# Patient Record
Sex: FEMALE | Race: WHITE | Employment: FULL TIME | URBAN - NONMETROPOLITAN AREA
[De-identification: names, ages, dates, MRNs, and addresses within clinical notes are randomized per-mention and may not be internally consistent; named-entity substitution may affect disease eponyms.]

---

## 2017-01-09 ENCOUNTER — OFFICE VISIT (OUTPATIENT)
Dept: OPHTHALMOLOGY | Age: 57
End: 2017-01-09

## 2017-01-09 DIAGNOSIS — H40.003 GLAUCOMA SUSPECT, BILATERAL: Primary | ICD-10-CM

## 2017-01-09 PROCEDURE — 99213 OFFICE O/P EST LOW 20 MIN: CPT | Performed by: OPHTHALMOLOGY

## 2017-01-09 RX ORDER — BENOXINATE HCL/FLUORESCEIN SOD 0.4%-0.25%
1 DROPS OPHTHALMIC (EYE) ONCE
Status: COMPLETED | OUTPATIENT
Start: 2017-01-09 | End: 2017-01-09

## 2017-01-09 RX ADMIN — Medication 1 DROP: at 16:47

## 2017-01-09 ASSESSMENT — TONOMETRY
OD_IOP_MMHG: 17
IOP_METHOD: NON-CONTACT AIR PUFF
OS_IOP_MMHG: 18

## 2017-01-09 ASSESSMENT — SLIT LAMP EXAM - LIDS
COMMENTS: NORMAL
COMMENTS: NORMAL

## 2017-01-09 ASSESSMENT — VISUAL ACUITY
OS_CC: 20/20
METHOD: SNELLEN - LINEAR

## 2017-01-09 ASSESSMENT — ENCOUNTER SYMPTOMS
RESPIRATORY NEGATIVE: 1
GASTROINTESTINAL NEGATIVE: 1

## 2017-07-13 ENCOUNTER — OFFICE VISIT (OUTPATIENT)
Dept: OPHTHALMOLOGY | Age: 57
End: 2017-07-13
Payer: COMMERCIAL

## 2017-07-13 DIAGNOSIS — H40.003 GLAUCOMA SUSPECT, BILATERAL: Primary | ICD-10-CM

## 2017-07-13 PROCEDURE — 99214 OFFICE O/P EST MOD 30 MIN: CPT | Performed by: OPHTHALMOLOGY

## 2017-07-13 RX ORDER — PHENYLEPHRINE HCL 2.5 %
1 DROPS OPHTHALMIC (EYE) ONCE
Status: COMPLETED | OUTPATIENT
Start: 2017-07-13 | End: 2017-07-13

## 2017-07-13 RX ORDER — KETOCONAZOLE 20 MG/G
CREAM TOPICAL DAILY
COMMUNITY

## 2017-07-13 RX ORDER — TROPICAMIDE 10 MG/ML
1 SOLUTION/ DROPS OPHTHALMIC ONCE
Status: COMPLETED | OUTPATIENT
Start: 2017-07-13 | End: 2017-07-13

## 2017-07-13 RX ORDER — BENOXINATE HCL/FLUORESCEIN SOD 0.4%-0.25%
1 DROPS OPHTHALMIC (EYE) ONCE
Status: COMPLETED | OUTPATIENT
Start: 2017-07-13 | End: 2017-07-13

## 2017-07-13 RX ADMIN — Medication 1 DROP: at 16:20

## 2017-07-13 RX ADMIN — TROPICAMIDE 1 DROP: 10 SOLUTION/ DROPS OPHTHALMIC at 16:20

## 2017-07-13 ASSESSMENT — CONF VISUAL FIELD
OD_NORMAL: 1
OS_NORMAL: 1

## 2017-07-13 ASSESSMENT — TONOMETRY
OS_IOP_MMHG: 16
OD_IOP_MMHG: 18

## 2017-07-13 ASSESSMENT — VISUAL ACUITY
OS_CC: 20/20
CORRECTION_TYPE: GLASSES
METHOD: SNELLEN - LINEAR

## 2017-07-13 ASSESSMENT — SLIT LAMP EXAM - LIDS: COMMENTS: NORMAL

## 2017-07-13 ASSESSMENT — ENCOUNTER SYMPTOMS
RESPIRATORY NEGATIVE: 1
GASTROINTESTINAL NEGATIVE: 1

## 2017-10-02 ENCOUNTER — OFFICE VISIT (OUTPATIENT)
Dept: OPTOMETRY | Age: 57
End: 2017-10-02
Payer: COMMERCIAL

## 2017-10-02 DIAGNOSIS — B02.9 HERPES ZOSTER WITHOUT COMPLICATION: Primary | ICD-10-CM

## 2017-10-02 PROCEDURE — 99202 OFFICE O/P NEW SF 15 MIN: CPT | Performed by: OPTOMETRIST

## 2017-10-02 RX ORDER — ACYCLOVIR 400 MG/1
400 TABLET ORAL
COMMUNITY
End: 2017-11-10 | Stop reason: CLARIF

## 2017-10-02 ASSESSMENT — VISUAL ACUITY
METHOD: SNELLEN - LINEAR
OS_CC: 20/20

## 2017-10-04 NOTE — PATIENT INSTRUCTIONS
Continue with the orals meds as prescribed; No drop was added because the eye itself is unaffected ;   If noticed any changes call immediately

## 2017-11-09 RX ORDER — SUCRALFATE 1 G/1
1 TABLET ORAL 4 TIMES DAILY
COMMUNITY

## 2017-11-09 RX ORDER — TACROLIMUS 1 MG/G
1 OINTMENT TOPICAL DAILY
COMMUNITY

## 2017-11-09 RX ORDER — ALENDRONATE SODIUM 70 MG/1
70 TABLET ORAL
COMMUNITY
Start: 2017-01-05 | End: 2020-01-13 | Stop reason: ALTCHOICE

## 2017-11-09 RX ORDER — DESLORATADINE 5 MG/1
5 TABLET ORAL
COMMUNITY
Start: 2016-03-21 | End: 2017-11-10 | Stop reason: CLARIF

## 2017-11-10 ENCOUNTER — INITIAL CONSULT (OUTPATIENT)
Dept: SURGERY | Age: 57
End: 2017-11-10
Payer: COMMERCIAL

## 2017-11-10 VITALS
HEART RATE: 84 BPM | BODY MASS INDEX: 30.56 KG/M2 | WEIGHT: 179 LBS | SYSTOLIC BLOOD PRESSURE: 110 MMHG | HEIGHT: 64 IN | DIASTOLIC BLOOD PRESSURE: 76 MMHG

## 2017-11-10 DIAGNOSIS — R10.84 GENERALIZED ABDOMINAL PAIN: ICD-10-CM

## 2017-11-10 DIAGNOSIS — R11.2 NAUSEA AND VOMITING, INTRACTABILITY OF VOMITING NOT SPECIFIED, UNSPECIFIED VOMITING TYPE: ICD-10-CM

## 2017-11-10 DIAGNOSIS — R11.0 NAUSEA: Primary | ICD-10-CM

## 2017-11-10 DIAGNOSIS — K62.5 RECTAL BLEEDING: ICD-10-CM

## 2017-11-10 DIAGNOSIS — K62.89 RECTAL PAIN: ICD-10-CM

## 2017-11-10 PROCEDURE — 99214 OFFICE O/P EST MOD 30 MIN: CPT | Performed by: SURGERY

## 2017-11-10 NOTE — PROGRESS NOTES
Maicol Bailey is a 59 y.o. female          CC:    Chief Complaint   Patient presents with    Heartburn    Nausea & Vomiting    Abdominal Pain         HISTORY OF PRESENT ILLNESS:    EGD  Nausea: yes  Vomiting: yes  Heartburn:yes  Dysphagia:no  Hematemesis:no  Epigastric pain:yes, tender to touch  Anemia: unknown  Previous work up date:2009-EGD=mild to moderate inflammation  Current Treatment:Carafate 2 BID, Prevacid BID          Colonoscopy  Abd pain: yes  Anemia: unknown  Bloating:yes  Diarrhea: yes  Constipation: yes  Melena: no  Hematochezia:no  Rectal Bleeding:no  Rectal/Anal Pain:yes, pressure  Pruritus: no  Family history colon Cancer: no  Previous colon cancer: no  Previous Colon Polyp: no  Change in bowels: yes  Decrease caliber of stool: no  Change in color of stool: no    Previous work up date: 2009      Patient is a 61-year-old female who is had a long history of GERD with reflux nausea and now has had increased vomiting and acid taste in the back of her throat. She has been on Prevacid twice a day and Carafate twice a day. Her last scope was in 2009 and she had one in 2003 with an EGD. These were both showed signs of gastritis and esophagitis with a positive H. pylori in 2003. She also complains of rectal bleeding and rectal pain and pressure in the rectal area as well as lower abdominal pain and irritability. She also has loose stools. And urgency.           Review of Systems:    General:  Fever: Negative  Weight Change:positive for 30lb wt loss the past 6 months  Night Sweats: Positive    Eye:  Blurry Vision:Negative  Double Vision: Negative    Ent:  Headaches: Negative  Sore throat: Negative    Allergy/Immunology:  Hives: Negative  Latex allergy: Negative    Hematology/Lymphatic:  Bleeding Problems: Negative  Blood Clots: Negative  Swollen Lymph Nodes: Negative    Lungs:  Cough: Negative  SOB: Negative  Wheezing:Negative    Cardiovascular:  Chest Pain: Positive  Palpitations:Negative    GI:   Decreased Appetite: Positive  Heartburn: Positive  Dysphagia: Negative  Nausea/Vomiting: Positive  Abdominal Pain: Positive  Change in Bowels:Positive  Constipation: Positive  Diarrhea: Positive  Rectal Bleeding: Negative    :   Dysuria: Negative  Increase Urinary Frequency/Urgency: Negative    Neuro:  Seizures: Negative  Confusion: Negative          PAST MEDICAL HISTORY:    Family History   Problem Relation Age of Onset    Glaucoma Mother     Coronary Art Dis Mother     Cataracts Neg Hx     Diabetes Neg Hx      Social History     Social History    Marital status:      Spouse name: N/A    Number of children: N/A    Years of education: N/A     Occupational History    Not on file.      Social History Main Topics    Smoking status: Never Smoker    Smokeless tobacco: Never Used    Alcohol use Not on file    Drug use: Unknown    Sexual activity: Not on file     Other Topics Concern    Not on file     Social History Narrative    No narrative on file     Past Surgical History:   Procedure Laterality Date    ABLATION OF DYSRHYTHMIC FOCUS  11/2045    APPENDECTOMY      CARDIAC CATHETERIZATION      CHOLECYSTECTOMY      COLONOSCOPY  2003    COLONOSCOPY  2009    HYSTERECTOMY      HYSTERECTOMY, TOTAL ABDOMINAL      SALIVARY GLAND SURGERY      UPPER GASTROINTESTINAL ENDOSCOPY  2003    gastric polyp, positive H-pylori, severe gastritis, [address]    UPPER GASTROINTESTINAL ENDOSCOPY  2009    mild to moderate chronic inflammation, [address]     Past Medical History:   Diagnosis Date    GERD (gastroesophageal reflux disease)     History of hypercholesterolemia     Tachycardia, paroxysmal ([facility] [address]     Thyroid disease      Current Outpatient Prescriptions on File Prior to Visit   Medication Sig Dispense Refill    Cholecalciferol (VITAMIN D3) 1000 units CAPS Take 1,000 Units by mouth      tacrolimus (PROTOPIC) 0.1 % ointment Apply 1 each topically daily Apply topically 2 times daily.  sucralfate (CARAFATE) 1 GM tablet Take 1 g by mouth 4 times daily      Loratadine (CLARITIN PO) Take 1 tablet by mouth daily      ketoconazole (NIZORAL) 2 % cream Apply topically daily Apply topically daily.  ALPRAZolam (XANAX) 0.25 MG tablet Take 0.25 mg by mouth nightly as needed for Sleep      lactobacillus acidophilus (FLORANEX) Take 1 tablet by mouth 2 times daily      thyroid (ARMOUR THYROID) 60 MG tablet Take 60 mg by mouth daily.  Lansoprazole (PREVACID PO) Take 30 mg by mouth 2 times daily.  alendronate (FOSAMAX) 70 MG tablet Take 70 mg by mouth      pravastatin (PRAVACHOL) 20 MG tablet Take 20 mg by mouth daily      ergocalciferol (ERGOCALCIFEROL) 46053 UNITS capsule Take 50,000 Units by mouth 2 times daily      Multiple Vitamins-Minerals (THERAPEUTIC MULTIVITAMIN-MINERALS) tablet Take 1 tablet by mouth daily       No current facility-administered medications on file prior to visit. Allergies as of 11/10/2017 - Review Complete 11/10/2017   Allergen Reaction Noted    Sulfa antibiotics Anaphylaxis 10/15/2014    Wine [alcohol] Swelling 11/10/2017    Compazine [prochlorperazine maleate] Other (See Comments) 10/15/2014    Iv contrast [iodides] Hives 10/15/2014    Macrodantin [nitrofurantoin macrocrystal]  12/09/2015    Prilosec [omeprazole]  12/09/2015    Zantac [ranitidine hcl]  12/09/2015         PHYSICAL EXAM:    Blood pressure 110/76, pulse 84, height 5' 3.5\" (1.613 m), weight 179 lb (81.2 kg), last menstrual period 07/10/1989.     Gen:  A and O x 3, NAD, well nourished  Eyes:  Sclera non icterus, PERRL  Head:  Normocephalic, non-tender  Neck:  Supple, no adenopathy, thyroid non tender and no masses,no carotid bruits  Lungs:  CTA, symmetrical  Chest:  RRR, no murmurs  Abd:  Soft, NT, ND, no HSM, no hernias, no bruits  Ext:  No edema, no cyanosis  Psych: reveals appropriate mood, memory and judgment,  Neuro:  Reveals no gross motor or sensory deficits,

## 2017-12-11 DIAGNOSIS — R63.4 WEIGHT LOSS: Primary | ICD-10-CM

## 2017-12-11 DIAGNOSIS — R10.84 GENERALIZED ABDOMINAL PAIN: ICD-10-CM

## 2017-12-11 DIAGNOSIS — R19.7 DIARRHEA, UNSPECIFIED TYPE: ICD-10-CM

## 2020-01-13 ENCOUNTER — OFFICE VISIT (OUTPATIENT)
Dept: PODIATRY | Age: 60
End: 2020-01-13
Payer: COMMERCIAL

## 2020-01-13 VITALS
SYSTOLIC BLOOD PRESSURE: 118 MMHG | HEART RATE: 68 BPM | WEIGHT: 140 LBS | HEIGHT: 64 IN | DIASTOLIC BLOOD PRESSURE: 70 MMHG | BODY MASS INDEX: 23.9 KG/M2

## 2020-01-13 PROCEDURE — 99202 OFFICE O/P NEW SF 15 MIN: CPT | Performed by: PODIATRIST

## 2020-01-13 RX ORDER — PIMECROLIMUS 10 MG/G
CREAM TOPICAL
COMMUNITY
Start: 2019-11-26

## 2020-01-13 NOTE — PROGRESS NOTES
Temperature within normal limits bilateral. Hyperpigmentation absent bilateral. No atrophic skin. Neurological: Sensation intact to light touch to level of digits, bilateral.  Protective sensation intact 10/10 sites via 5.07/10g [place] Monofilament, bilateral.  negative Tinel's, bilateral.  negative Valleix sign, bilateral.  Vibratory intact bilateral.  Reflexes Decreased bilateral.  Paresthesias negative. Dysthesias negative. Sharp/dull intact bilateral.    Musculoskeletal: Muscle strength 5/5, Bilateral.  Pain absent upon palpation of medial calcaneal tubercle, Bilateral. decreased medial longitudinal arch, Bilateral.  Ankle ROM within normal limits,Bilateral.  1st MPJ ROM within normal limits, Bilateral.  Dorsally contracted digits absent digits , Bilateral. Other foot deformities pronation with WB. Integument: Warm, dry, supple, bilateral.  Open lesion absent, bilateral.  Interdigital maceration absent to web spaces bilateral.  Nails within normal limits. Fissures absent, bilateral. Hyperkeratotic tissue is absent. Gait analysis:   RCSP left is 1 degrees. Right is 1 degrees. NCSP left is 0 degrees. Right is 0 degrees. Medial talonavicular bulge is present Bilateral.  Pes abductus is absent Bilateral.  Early toe off absent Bilateral.  Limb length discrepancy absent. Asessment: Patient is a 62 y.o. female with:    Diagnosis Orders   1. Plantar fasciitis, bilateral     2. Pain of right lower extremity     3. Pronation of both feet         Plan: Patient examined and evaluated. Current condition and treatment options discussed in detail. Patient was given plantar fasciitis instruction sheet. Patient will start stretching, icing, anti-inflammatory, and arch supports in shoe gear 100% of the time WB. Patient will not go barefoot. Codes given for custom orthotics. Pt will contact their insurance company and consider this option to treat the deformity/pain.   Pt was educated on how

## 2020-01-13 NOTE — PATIENT INSTRUCTIONS
: Custom fit Orthotics  $490  [id number]: Mold for Custom fit orthotics $76      Diagnosis Codes:    Diagnosis Orders   1. Plantar fasciitis, bilateral     2. Pain of right lower extremity         Plantar Fasciitis Treatment    1. Stretching - 3 times per day minimum for 5-10 minutes. Include stretching against a wall or counter with one foot back and heel flat,    Leaning into a step, or using a towel or belt. Be active and aggressive with your stretching for maximum benefit. 2.  Ice - 3 times per day. Use a frozen water bottle and roll your foot over it. Helps to  stretch and massage the area at the same time. 3.  Anti-inflammatory - take OTC or Rx as ordered. 4.  Arch support and supportive shoe gear. Wear orthotics or prefabricated arch supports at ALL times in supportive  shoegear. Do NOT go barefoot.

## 2020-02-14 ENCOUNTER — OFFICE VISIT (OUTPATIENT)
Dept: PODIATRY | Age: 60
End: 2020-02-14
Payer: COMMERCIAL

## 2020-02-14 VITALS
SYSTOLIC BLOOD PRESSURE: 118 MMHG | HEIGHT: 64 IN | DIASTOLIC BLOOD PRESSURE: 76 MMHG | WEIGHT: 140 LBS | BODY MASS INDEX: 23.9 KG/M2

## 2020-02-14 PROCEDURE — 99999 PR OFFICE/OUTPT VISIT,PROCEDURE ONLY: CPT | Performed by: PODIATRIST

## 2020-02-14 NOTE — LETTER
[address] Podiatry A department of [facility]  Phone: [phone]  Fax: [phone]    TIANA [facility]        February 14, 2020     Patient: Ian Steel   YOB: 1960   Date of Visit: 2/14/2020       To Whom It May Concern: It is my medical opinion that Ian Steel had an appointment with Dr. Julia Jiang and may return to work on 2/14/2020    If you have any questions or concerns, please don't hesitate to call.     Sincerely,        Wanda Nation DPM

## 2020-02-14 NOTE — PROGRESS NOTES
Patient presents to the clinic today for  of custom molded orthotic devices. These were fit to the patients foot and shoe gear and fit well. Patient was advised there may be a break in period for these. They should gradually increase the amount of time they wear these until it is 100% comfortable. Any troubles, pain, or signs of irritation and the patient should present back to the clinic immediately for modification. Otherwise the patient should be seen back on a PRN basis pending effectiveness of the orthotic therapy. Information given on [address] [place] [address] in [place], [place]. They were advised importance of appropriate fitting and supportive tennis shoes and how [employer] can help.

## 2023-01-10 DIAGNOSIS — U07.1 PNEUMONIA DUE TO COVID-19 VIRUS: ICD-10-CM

## 2023-01-10 DIAGNOSIS — E78.5 DYSLIPIDEMIA: ICD-10-CM

## 2023-01-10 DIAGNOSIS — R94.30 CARDIAC LEFT VENTRICULAR EJECTION FRACTION GREATER THAN 40 PERCENT: ICD-10-CM

## 2023-01-10 DIAGNOSIS — M25.50 ARTHRALGIA, UNSPECIFIED JOINT: ICD-10-CM

## 2023-01-10 DIAGNOSIS — I47.9 TACHYCARDIA, PAROXYSMAL (HCC): ICD-10-CM

## 2023-01-10 DIAGNOSIS — K58.0 IRRITABLE BOWEL SYNDROME WITH DIARRHEA: ICD-10-CM

## 2023-01-10 DIAGNOSIS — M79.10 MYALGIA: ICD-10-CM

## 2023-01-10 DIAGNOSIS — R00.2 PALPITATIONS: ICD-10-CM

## 2023-01-10 DIAGNOSIS — K52.9 GASTROENTERITIS, ACUTE: ICD-10-CM

## 2023-01-10 DIAGNOSIS — J12.82 PNEUMONIA DUE TO COVID-19 VIRUS: ICD-10-CM

## 2023-01-10 DIAGNOSIS — G89.29 CHRONIC RIGHT-SIDED LOW BACK PAIN WITH RIGHT-SIDED SCIATICA: ICD-10-CM

## 2023-01-10 DIAGNOSIS — Z86.79 S/P ABLATION OF ATRIAL FLUTTER: ICD-10-CM

## 2023-01-10 DIAGNOSIS — E07.9 THYROID DISEASE: ICD-10-CM

## 2023-01-10 DIAGNOSIS — I47.1 SVT (SUPRAVENTRICULAR TACHYCARDIA) (HCC): ICD-10-CM

## 2023-01-10 DIAGNOSIS — E55.9 VITAMIN D DEFICIENCY: ICD-10-CM

## 2023-01-10 DIAGNOSIS — I49.1 PREMATURE ATRIAL BEATS: ICD-10-CM

## 2023-01-10 DIAGNOSIS — Z86.39 HISTORY OF HYPERCHOLESTEROLEMIA: Primary | ICD-10-CM

## 2023-01-10 DIAGNOSIS — I48.3 TYPICAL ATRIAL FLUTTER (HCC): ICD-10-CM

## 2023-01-10 DIAGNOSIS — M54.41 CHRONIC RIGHT-SIDED LOW BACK PAIN WITH RIGHT-SIDED SCIATICA: ICD-10-CM

## 2023-01-10 DIAGNOSIS — Z98.890 S/P ABLATION OF ATRIAL FLUTTER: ICD-10-CM

## 2023-01-10 DIAGNOSIS — F41.1 GENERALIZED ANXIETY DISORDER: ICD-10-CM

## 2023-01-10 PROBLEM — M81.0 OSTEOPOROSIS: Status: ACTIVE | Noted: 2023-01-10

## 2023-01-10 PROBLEM — I47.10 SVT (SUPRAVENTRICULAR TACHYCARDIA): Status: ACTIVE | Noted: 2023-01-10

## 2023-01-10 PROBLEM — K21.9 GERD (GASTROESOPHAGEAL REFLUX DISEASE): Status: ACTIVE | Noted: 2023-01-10

## 2023-01-10 RX ORDER — AZELASTINE 1 MG/ML
1 SPRAY, METERED NASAL 2 TIMES DAILY
COMMUNITY

## 2023-01-10 RX ORDER — DENOSUMAB 60 MG/ML
60 INJECTION SUBCUTANEOUS
COMMUNITY

## 2023-01-10 RX ORDER — PROPAFENONE HYDROCHLORIDE 150 MG/1
300 TABLET, FILM COATED ORAL 2 TIMES DAILY PRN
COMMUNITY

## 2023-01-10 RX ORDER — IBUPROFEN 200 MG
2 CAPSULE ORAL DAILY
COMMUNITY

## 2023-01-10 RX ORDER — DESONIDE 0.5 MG/G
CREAM TOPICAL 2 TIMES DAILY
COMMUNITY

## 2023-01-10 RX ORDER — ESTRADIOL 0.1 MG/G
0.5 CREAM VAGINAL
COMMUNITY
Start: 2020-02-10

## 2023-01-10 RX ORDER — ALBUTEROL SULFATE 90 UG/1
2 AEROSOL, METERED RESPIRATORY (INHALATION) EVERY 6 HOURS PRN
COMMUNITY
Start: 2022-12-08

## 2023-01-10 RX ORDER — TAMSULOSIN HYDROCHLORIDE 0.4 MG/1
0.4 CAPSULE ORAL DAILY
COMMUNITY

## 2023-01-10 RX ORDER — ONDANSETRON 4 MG/1
4 TABLET, ORALLY DISINTEGRATING ORAL EVERY 8 HOURS PRN
COMMUNITY
Start: 2022-12-08

## 2023-01-10 RX ORDER — ACETAMINOPHEN 500 MG
500 TABLET ORAL EVERY 6 HOURS PRN
COMMUNITY

## 2023-01-10 RX ORDER — FAMOTIDINE 20 MG/1
20 TABLET, FILM COATED ORAL 2 TIMES DAILY
COMMUNITY
Start: 2023-01-09

## 2023-01-10 RX ORDER — DICYCLOMINE HYDROCHLORIDE 10 MG/1
10 CAPSULE ORAL 4 TIMES DAILY
COMMUNITY
Start: 2022-10-13

## 2023-01-10 RX ORDER — HYDROXYZINE PAMOATE 25 MG/1
25 CAPSULE ORAL NIGHTLY
COMMUNITY

## 2023-01-10 RX ORDER — FLUTICASONE PROPIONATE 50 MCG
1 SPRAY, SUSPENSION (ML) NASAL DAILY
COMMUNITY

## 2023-01-20 ENCOUNTER — INITIAL CONSULT (OUTPATIENT)
Dept: SURGERY | Age: 63
End: 2023-01-20
Payer: COMMERCIAL

## 2023-01-20 VITALS
HEIGHT: 63 IN | TEMPERATURE: 97 F | DIASTOLIC BLOOD PRESSURE: 68 MMHG | HEART RATE: 93 BPM | OXYGEN SATURATION: 99 % | SYSTOLIC BLOOD PRESSURE: 110 MMHG | WEIGHT: 163.8 LBS | BODY MASS INDEX: 29.02 KG/M2

## 2023-01-20 DIAGNOSIS — K21.9 GASTROESOPHAGEAL REFLUX DISEASE WITHOUT ESOPHAGITIS: Primary | ICD-10-CM

## 2023-01-20 PROCEDURE — 99214 OFFICE O/P EST MOD 30 MIN: CPT | Performed by: SURGERY

## 2023-01-20 NOTE — PROGRESS NOTES
History of Present Illness:  EGD         Nausea: yes, 2 days a few weeks ago  Vomiting: yes, 2 days a few weeks ago  Heartburn:yes, food related   Dysphagia:no  Hematemesis:no  Epigastric pain:yes, worse in the last 2 weeks  Anemia: no  Previous work up date:12-1-2017 by Dr. Liudmila Tran with findings of esophagitis and gastritis  Current Treatment:Pepcid 20 mg twice daily, Prevacid 30 mg twice daily  for very long timg and Carafate as needed

## 2023-01-20 NOTE — PROGRESS NOTES
Lavon Marmolejo is a 57 y.o. female      CC:    Nausea/vomiting  Upper abdominal pain    HISTORY OF PRESENT ILLNESS:    Pt is 58 yo F who states that 2 weeks ago she developed severe vomiting for 2 days pain that developed on the right and the left upper abdomen and went to the epigastric area. She had dry heaves. She started taking Zofran which helped. The nausea and vomiting lasted about 2 to 3 days. And then she could not eat for about a week. Now she is back to normal she can eat but she still has pain on and off. Food search the pain she is not sure of any alleviating factors. The pain last about 5 minutes. Happens most of the time that she eats pain is as bad as it was 2 weeks ago but she has no vomiting. She does have heartburn but is not much she has no dysphagia. She has a history of a gastric ulcer she had a scope in 2017 with with it was fairly normal she did have a previous history of a gastric ulcer. She denies any significant lower GI symptoms.   She is up-to-date on her colonoscopy       Nausea: yes, 2 days a few weeks ago  Vomiting: yes, 2 days a few weeks ago  Heartburn:yes, food related   Dysphagia:no  Hematemesis:no  Epigastric pain:yes, worse in the last 2 weeks  Anemia: no  Previous work up date:12-1-2017 by Dr. Lupe Aponte with findings of esophagitis and gastritis  Current Treatment:Pepcid 20 mg twice daily, Prevacid 30 mg twice daily  for very long timg and Carafate as needed             Review of Systems:    General:  Fever: Negative  Weight Change:Negative  Night Sweats: Negative    Eye:  Blurry Vision:Negative  Double Vision: Negative    Ent:  Headaches: Negative  Sore throat: Negative    Allergy/Immunology:  Hives: Negative  Latex allergy: Negative    Hematology/Lymphatic:  Bleeding Problems: Negative  Blood Clots: Negative  Swollen Lymph Nodes: Negative    Lungs:  Cough: Negative  SOB: Negative  Wheezing:Negative    Cardiovascular:  Chest Pain: Negative  Palpitations:Negative    GI:   Decreased Appetite: Negative  Heartburn: Negative  Dysphagia: Negative  Nausea/Vomiting: Negative  Abdominal Pain: Negative  Change in Bowels:Negative  Constipation: Negative  Diarrhea: Negative  Rectal Bleeding: Negative    :   Dysuria: Negative  Increase Urinary Frequency/Urgency: Negative    Neuro:  Seizures: Negative  Confusion: Negative        PAST MEDICAL HISTORY:      Family History   Problem Relation Age of Onset    Hypertension Mother     Heart Attack Mother     Heart Failure Mother     Glaucoma Mother     Coronary Art Dis Mother     Heart Disease Mother     Heart Failure Father     Hypertension Father     Coronary Art Dis Father     COPD Father     Heart Attack Father     Hypertension Brother     Heart Disease Brother     Cataracts Neg Hx     Diabetes Neg Hx      Social History     Socioeconomic History    Marital status:      Spouse name: Not on file    Number of children: Not on file    Years of education: Not on file    Highest education level: Not on file   Occupational History    Not on file   Tobacco Use    Smoking status: Never    Smokeless tobacco: Never   Vaping Use    Vaping Use: Never used   Substance and Sexual Activity    Alcohol use: Not Currently    Drug use: Never    Sexual activity: Not on file   Other Topics Concern    Not on file   Social History Narrative    Not on file     Social Determinants of Health     Financial Resource Strain: Not on file   Food Insecurity: Not on file   Transportation Needs: Not on file   Physical Activity: Not on file   Stress: Not on file   Social Connections: Not on file   Intimate Partner Violence: Not on file   Housing Stability: Not on file     Past Surgical History:   Procedure Laterality Date    ABLATION OF DYSRHYTHMIC FOCUS  11/18/2015    by  @ [facility] with 3D Cardiac Ma CARTO cooling    APPENDECTOMY      CARDIAC CATHETERIZATION      CARDIAC ELECTROPHYSIOLOGY PROCEDURE  12/23/2021    EP RF Ablation SVT by Dr. Raoul Stoll @ [facility] with 3 D Cardiac Map CARTO with cooling and left Atrial Pacing    CARDIAC ELECTROPHYSIOLOGY PROCEDURE  03/08/2022    EP & Alation- A FIB by Dr. Eliceo Colon @ [address]      X 3    CHOLECYSTECTOMY      COLONOSCOPY  2003    COLONOSCOPY  2009    COLONOSCOPY  12/01/2017    [address]    CYSTOSCOPY  05/31/2022    with Retrograde Pyelogram with bladder bx with fulgeration by Dr. Poonam Ortiz. @ [address] (CERVIX STATUS UNKNOWN)      HYSTERECTOMY, TOTAL ABDOMINAL (CERVIX REMOVED)      LAPAROSCOPY      SALIVARY GLAND SURGERY      SALIVARY GLAND SURGERY      excision    UPPER GASTROINTESTINAL ENDOSCOPY  2003    gastric polyp, positive H-pylori, severe gastritis, [address]    UPPER GASTROINTESTINAL ENDOSCOPY  2009    mild to moderate chronic inflammation, [address]    UPPER GASTROINTESTINAL ENDOSCOPY  12/01/2017    [id number]     Past Medical History:   Diagnosis Date    Atrial tachycardia ([address]     Cardiac left ventricular ejection fraction greater than 40 percent     65%- ECHO 3-2021    Chronic low back pain with right-sided sciatica     Dyslipidemia     Gastroenteritis, acute     Generalized anxiety disorder     GERD (gastroesophageal reflux disease)     History of hypercholesterolemia     Hyperlipidemia     Hypothyroidism     Irritable bowel syndrome with diarrhea     Joint pain     Myalgia     Osteoporosis     Palpitations     Pneumonia due to COVID-19 virus     Polyarthralgia     Premature atrial beats     S/P ablation of atrial flutter     SVT (supraventricular tachycardia) (HCC)     Tachycardia, paroxysmal (HCC)     Thyroid disease     Typical atrial flutter (HCC)     Vitamin D deficiency      Current Outpatient Medications on File Prior to Visit   Medication Sig Dispense Refill    calcium carbonate (OYSTER SHELL CALCIUM 500 MG) 1250 (500 Ca) MG tablet Take 2 tablets by mouth daily      denosumab (PROLIA) 60 MG/ML SOSY SC injection Inject 60 mg into the skin every 6 months      desonide (DESOWEN) 0.05 % cream Apply topically 2 times daily Apply topically 2 times daily. estradiol (ESTRACE) 0.1 MG/GM vaginal cream Place 0.5 g vaginally      famotidine (PEPCID) 20 MG tablet Take 20 mg by mouth 2 times daily      fluocinonide (LIDEX) 0.05 % cream Apply topically daily as needed Apply topically 2 times daily. fluticasone (FLONASE) 50 MCG/ACT nasal spray 1 spray by Each Nostril route daily      hydrOXYzine pamoate (VISTARIL) 25 MG capsule Take 25 mg by mouth nightly      NONFORMULARY Vandazole 0.75% vaginal gel      dicyclomine (BENTYL) 10 MG capsule Take 10 mg by mouth 4 times daily      Levocetirizine Dihydrochloride (XYZAL PO) Take 5 mg by mouth daily      pimecrolimus (ELIDEL) 1 % cream       Cholecalciferol (VITAMIN D3) 1000 units CAPS Take 1,000 Units by mouth daily      ketoconazole (NIZORAL) 2 % cream Apply topically daily Apply topically daily. lactobacillus acidophilus [facility]) Take 1 tablet by mouth 2 times daily      Multiple Vitamins-Minerals (THERAPEUTIC MULTIVITAMIN-MINERALS) tablet Take 1 tablet by mouth daily      thyroid (ARMOUR) 60 MG tablet Take 60 mg by mouth daily. Lansoprazole (PREVACID PO) Take 30 mg by mouth 2 times daily.       acetaminophen (TYLENOL) 500 MG tablet Take 500 mg by mouth every 6 hours as needed for Pain (Patient not taking: Reported on 1/20/2023)      ondansetron (ZOFRAN-ODT) 4 MG disintegrating tablet Place 4 mg under the tongue every 8 hours as needed (Patient not taking: Reported on 1/20/2023)      propafenone (RYTHMOL) 150 MG tablet Take 300 mg by mouth 2 times daily as needed (Patient not taking: Reported on 1/20/2023)      albuterol sulfate HFA (PROVENTIL;VENTOLIN;PROAIR) 108 (90 Base) MCG/ACT inhaler Inhale 2 puffs into the lungs every 6 hours as needed (Patient not taking: Reported on 1/20/2023)      azelastine (ASTELIN) 0.1 % nasal spray 1 spray by Nasal route 2 times daily Use in each nostril as directed (Patient not taking: Reported on 1/20/2023)      dilTIAZem (CARDIZEM) 30 MG tablet Take 30 mg by mouth daily as needed (Patient not taking: Reported on 1/20/2023)      sucralfate (CARAFATE) 1 GM tablet Take 1 g by mouth 4 times daily (Patient not taking: Reported on 1/20/2023)      ALPRAZolam (XANAX) 0.25 MG tablet Take 0.25 mg by mouth nightly as needed for Sleep (Patient not taking: Reported on 1/20/2023)      ergocalciferol (ERGOCALCIFEROL) 98946 UNITS capsule Take 50,000 Units by mouth 2 times daily       No current facility-administered medications on file prior to visit. Allergies as of 01/20/2023 - Fully Reviewed 01/20/2023   Allergen Reaction Noted    Eggs or egg-derived products Anaphylaxis 10/27/2020    Sulfa antibiotics Anaphylaxis 10/15/2014    Wine [alcohol] Swelling 11/10/2017    Compazine [prochlorperazine maleate] Other (See Comments) 10/15/2014    Iv contrast [iodides] Hives 10/15/2014    Macrodantin [nitrofurantoin macrocrystal] Hives 12/09/2015    Nexium i.v. [esomeprazole sodium] Hives 01/10/2023    Prilosec [omeprazole] Hives 12/09/2015    Zantac [ranitidine hcl] Hives 12/09/2015    Zoloft [sertraline] Other (See Comments) 01/10/2023         PHYSICAL EXAM:    Blood pressure 110/68, pulse 93, temperature 97 °F (36.1 °C), temperature source Infrared, height 5' 3\" (1.6 m), weight 163 lb 12.8 oz (74.3 kg), SpO2 99 %.     Gen:  A and O x 3, NAD, well nourished  Eyes:  Sclera non icterus, PERRL  Head:  Normocephalic, non-tender  Neck:  Supple, no adenopathy, thyroid non tender and no masses,no carotid bruits  Lungs:  CTA, symmetrical  Chest:  RRR, no murmurs  Abd:  Soft, NT, ND, no HSM, no hernias, no bruits  Ext:  No edema, no cyanosis  Psych: reveals appropriate mood, memory and judgment,  Neuro:  Reveals no gross motor or sensory deficits,   Msk:  5/5 strength all 4 extremities, no joint tenderness          ASSESS MENT:    Nausea and emesis  Epigastric pain  Hx of PUD with gastric ulcer  Long hx GERD with tx with prevacid 30 mg bd and pepcid 20 mg bid for over 2-3 years or more.   Last EGD 2017      PLAN:     Stay on same meds for now  Set up for EGD  Make further recommendations after EGD

## 2023-02-22 ENCOUNTER — TELEPHONE (OUTPATIENT)
Dept: SURGERY | Age: 63
End: 2023-02-22

## 2023-02-22 DIAGNOSIS — R10.13 EPIGASTRIC ABDOMINAL PAIN: ICD-10-CM

## 2023-02-22 DIAGNOSIS — R11.2 NAUSEA AND VOMITING, UNSPECIFIED VOMITING TYPE: Primary | ICD-10-CM

## 2023-02-22 NOTE — TELEPHONE ENCOUNTER
Talked to patient with results from recent EGD at [facility] with Dr. Delta Gallardo on 2-10-23. Updated history and forwarded results to PCP. Scheduled EGD with 48 hour BRAVO for 4-18-23 and [facility] made aware. Patient has the paperwork.      Orders placed for labs and Small Bowel Follow Through

## 2023-02-24 NOTE — TELEPHONE ENCOUNTER
Pre- Certification for SBFT done and does not require pre authorization. Ref# [id number]. Faxed to [facility]- Radiology and made patient aware. Patient is going to make the appointment herself.

## 2023-03-02 DIAGNOSIS — R10.13 EPIGASTRIC ABDOMINAL PAIN: ICD-10-CM

## 2023-03-02 DIAGNOSIS — R11.2 NAUSEA AND VOMITING, UNSPECIFIED VOMITING TYPE: ICD-10-CM

## 2023-06-22 ENCOUNTER — TELEPHONE (OUTPATIENT)
Dept: SURGERY | Age: 63
End: 2023-06-22

## 2023-06-22 NOTE — TELEPHONE ENCOUNTER
Patient called stating she got a bill and she never had a procedure done. Advised patient to fax me her bill and I would like into the situation and call her back.

## 2023-06-23 NOTE — TELEPHONE ENCOUNTER
Obtained patient's copy of her [id number] [address] MD billing statement from 6/15/23. Explained that procedure was completed on 2/10/23 and Keke confirmed that patient signed a consent to treat for the procedure on that date. Discussed that the balance of her bill is the amount that her insurance applied to her deductible. Patient stated she is having some memory issues and has been having a hard time with things since her Mother passed away. She stated she would send a payment in to clear up her bill. She was appreciative of the investigation to help her understand.

## 2023-07-12 ENCOUNTER — OFFICE VISIT (OUTPATIENT)
Dept: NEUROLOGY | Age: 63
End: 2023-07-12
Payer: COMMERCIAL

## 2023-07-12 ENCOUNTER — HOSPITAL ENCOUNTER (OUTPATIENT)
Dept: NEUROLOGY | Age: 63
Discharge: HOME | End: 2023-07-12
Attending: PSYCHIATRY & NEUROLOGY
Payer: COMMERCIAL

## 2023-07-12 VITALS
HEIGHT: 63 IN | HEART RATE: 68 BPM | BODY MASS INDEX: 30.12 KG/M2 | SYSTOLIC BLOOD PRESSURE: 119 MMHG | DIASTOLIC BLOOD PRESSURE: 79 MMHG | WEIGHT: 170 LBS

## 2023-07-12 DIAGNOSIS — G31.84 MILD COGNITIVE IMPAIRMENT: Primary | ICD-10-CM

## 2023-07-12 DIAGNOSIS — G31.84 MILD COGNITIVE IMPAIRMENT: ICD-10-CM

## 2023-07-12 PROCEDURE — 95816 EEG AWAKE AND DROWSY: CPT

## 2023-07-12 PROCEDURE — 95816 EEG AWAKE AND DROWSY: CPT | Performed by: PSYCHIATRY & NEUROLOGY

## 2023-07-12 PROCEDURE — 1123F ACP DISCUSS/DSCN MKR DOCD: CPT | Performed by: PSYCHIATRY & NEUROLOGY

## 2023-07-12 PROCEDURE — 99204 OFFICE O/P NEW MOD 45 MIN: CPT | Performed by: PSYCHIATRY & NEUROLOGY

## 2023-07-12 RX ORDER — LORAZEPAM 0.5 MG/1
0.5 TABLET ORAL 2 TIMES DAILY
COMMUNITY

## 2023-07-12 RX ORDER — DONEPEZIL HYDROCHLORIDE 5 MG/1
5 TABLET, FILM COATED ORAL NIGHTLY
Qty: 30 TABLET | Refills: 3 | Status: SHIPPED | OUTPATIENT
Start: 2023-07-12

## 2023-07-13 ENCOUNTER — TELEPHONE (OUTPATIENT)
Dept: INTERVENTIONAL RADIOLOGY/VASCULAR | Age: 63
End: 2023-07-13

## 2023-07-13 NOTE — PROCEDURES
PATIENT:   Raghavendra Zelaya  READING PHYSICIAN:  Yolis Garrido    TECHNIQUE:  22 channels of EEG and 1 channel of EKG were recorded utilizing the International 10/20 System. CLINICAL HISTORY: This is a 70 y.o. female with The encounter diagnosis was Mild cognitive impairment. .  EEG is being performed to evaluate for epileptiform activity. Medications: Scheduled Meds:        EEG FINDINGS:   The background activity consisted of 5-6 Hz of theta activity but there is also alpha activity seen at times. Frontal and central leads bilaterally were dominated by low amplitude fast featureless beta activity. Intermittent eye blink and movement artifact were seen during the tracing. Sleep was not obtained. EKG lead showed normal sinus rhythm. ACTIVATION: Hyperventilation: Not done. Photic stimulation: No photic stimulation performed. IMPRESSION:  This is an abnormal EEG due to findings suggestive of mild encephalopathy. Differential diagnosis for encephalopathy includes toxic, metabolic or other etiologies. No clinical or electrographic seizures were recorded during the study. Clinical correlation is recommended.  I hope you find this information helpful

## 2023-07-17 ENCOUNTER — TELEPHONE (OUTPATIENT)
Dept: SURGERY | Age: 63
End: 2023-07-17

## 2023-07-17 PROBLEM — I47.19 ATRIAL TACHYCARDIA: Status: ACTIVE | Noted: 2022-03-08

## 2023-07-17 PROBLEM — F41.1 GENERALIZED ANXIETY DISORDER: Status: ACTIVE | Noted: 2020-11-30

## 2023-07-17 PROBLEM — E78.5 HYPERLIPIDEMIA: Status: ACTIVE | Noted: 2022-03-14

## 2023-07-17 PROBLEM — I47.10 SVT (SUPRAVENTRICULAR TACHYCARDIA): Status: ACTIVE | Noted: 2021-12-09

## 2023-07-17 PROBLEM — R94.30 CARDIAC LV EJECTION FRACTION >40%: Status: ACTIVE | Noted: 2021-12-22

## 2023-07-17 PROBLEM — K52.9 GASTROENTERITIS, ACUTE: Status: ACTIVE | Noted: 2017-12-30

## 2023-07-17 PROBLEM — K21.00 GASTROESOPHAGEAL REFLUX DISEASE WITH ESOPHAGITIS: Status: ACTIVE | Noted: 2022-03-14

## 2023-07-17 PROBLEM — R00.2 PALPITATIONS: Status: ACTIVE | Noted: 2021-03-10

## 2023-07-17 PROBLEM — J12.82 PNEUMONIA DUE TO COVID-19 VIRUS: Status: ACTIVE | Noted: 2022-12-08

## 2023-07-17 PROBLEM — I47.1 SVT (SUPRAVENTRICULAR TACHYCARDIA) (HCC): Status: ACTIVE | Noted: 2021-12-09

## 2023-07-17 PROBLEM — I49.1 PREMATURE ATRIAL BEATS: Status: ACTIVE | Noted: 2021-08-02

## 2023-07-17 PROBLEM — I47.1 ATRIAL TACHYCARDIA (HCC): Status: ACTIVE | Noted: 2022-03-08

## 2023-07-17 PROBLEM — K58.0 IRRITABLE BOWEL SYNDROME WITH DIARRHEA: Status: ACTIVE | Noted: 2020-10-21

## 2023-07-17 PROBLEM — U07.1 PNEUMONIA DUE TO COVID-19 VIRUS: Status: ACTIVE | Noted: 2022-12-08

## 2023-07-17 PROBLEM — M81.0 AGE-RELATED OSTEOPOROSIS WITHOUT CURRENT PATHOLOGICAL FRACTURE: Status: ACTIVE | Noted: 2021-06-11

## 2023-07-17 NOTE — TELEPHONE ENCOUNTER
Received a phone call from Ismael Araujo at [facility] stating that patient is cancelling her 50 hour BRAVO that is scheduled for 7-18-23. Patient states that she has another testing that needs done.   Left message for patient to call office to reschedule the 48 hour BRAVO

## 2023-07-18 ENCOUNTER — HOSPITAL ENCOUNTER (OUTPATIENT)
Dept: INTERVENTIONAL RADIOLOGY/VASCULAR | Age: 63
Discharge: HOME | End: 2023-07-20
Payer: COMMERCIAL

## 2023-07-18 VITALS
HEART RATE: 76 BPM | RESPIRATION RATE: 20 BRPM | DIASTOLIC BLOOD PRESSURE: 72 MMHG | OXYGEN SATURATION: 96 % | TEMPERATURE: 97.9 F | BODY MASS INDEX: 28.35 KG/M2 | SYSTOLIC BLOOD PRESSURE: 123 MMHG | WEIGHT: 160 LBS | HEIGHT: 63 IN

## 2023-07-18 DIAGNOSIS — G31.84 MILD COGNITIVE IMPAIRMENT: ICD-10-CM

## 2023-07-18 LAB
C GATTII+NEOFOR DNA CSF QL NAA+NON-PROBE: NOT DETECTED
CMV DNA CSF QL NAA+NON-PROBE: NOT DETECTED
E COLI K1 DNA CSF QL NAA+NON-PROBE: NOT DETECTED
ERYTHROCYTE [SEDIMENTATION RATE] IN BLOOD BY PHOTOMETRIC METHOD: 16 MM/HR (ref 0–30)
EV RNA CSF QL NAA+NON-PROBE: NOT DETECTED
GP B STREP DNA CSF QL NAA+NON-PROBE: NOT DETECTED
HAEM INFLU DNA CSF QL NAA+NON-PROBE: NOT DETECTED
HHV6 DNA CSF QL NAA+NON-PROBE: NOT DETECTED
HSV1 DNA CSF QL NAA+NON-PROBE: NOT DETECTED
HSV2 DNA CSF QL NAA+NON-PROBE: NOT DETECTED
INR PPP: 1
L MONOCYTOG DNA CSF QL NAA+NON-PROBE: NOT DETECTED
N MEN DNA CSF QL NAA+NON-PROBE: NOT DETECTED
PARECHOVIRUS A RNA CSF QL NAA+NON-PROBE: NOT DETECTED
PLATELET # BLD AUTO: 225 K/UL (ref 138–453)
PROTHROMBIN TIME: 13.1 SEC (ref 11.5–14.2)
S PNEUM DNA CSF QL NAA+NON-PROBE: NOT DETECTED
SPECIMEN DESCRIPTION: NORMAL
VZV DNA CSF QL NAA+NON-PROBE: NOT DETECTED

## 2023-07-18 PROCEDURE — 85652 RBC SED RATE AUTOMATED: CPT

## 2023-07-18 PROCEDURE — 85610 PROTHROMBIN TIME: CPT

## 2023-07-18 PROCEDURE — 7100000011 HC PHASE II RECOVERY - ADDTL 15 MIN

## 2023-07-18 PROCEDURE — 7100000010 HC PHASE II RECOVERY - FIRST 15 MIN

## 2023-07-18 PROCEDURE — 62328 DX LMBR SPI PNXR W/FLUOR/CT: CPT

## 2023-07-18 PROCEDURE — 85049 AUTOMATED PLATELET COUNT: CPT

## 2023-07-18 PROCEDURE — 87483 CNS DNA AMP PROBE TYPE 12-25: CPT

## 2023-07-18 PROCEDURE — 2580000003 HC RX 258: Performed by: RADIOLOGY

## 2023-07-18 RX ORDER — SODIUM CHLORIDE 9 MG/ML
INJECTION, SOLUTION INTRAVENOUS CONTINUOUS
Status: DISCONTINUED | OUTPATIENT
Start: 2023-07-18 | End: 2023-07-21 | Stop reason: HOSPADM

## 2023-07-18 RX ADMIN — SODIUM CHLORIDE: 9 INJECTION, SOLUTION INTRAVENOUS at 13:11

## 2023-07-18 ASSESSMENT — PAIN DESCRIPTION - DESCRIPTORS
DESCRIPTORS: ACHING

## 2023-07-18 ASSESSMENT — PAIN DESCRIPTION - ORIENTATION: ORIENTATION: ANTERIOR

## 2023-07-18 ASSESSMENT — PAIN DESCRIPTION - LOCATION
LOCATION: HEAD
LOCATION: HEAD

## 2023-07-18 ASSESSMENT — PAIN SCALES - GENERAL
PAINLEVEL_OUTOF10: 2

## 2023-07-18 ASSESSMENT — PAIN - FUNCTIONAL ASSESSMENT: PAIN_FUNCTIONAL_ASSESSMENT: 0-10

## 2023-07-18 NOTE — BRIEF OP NOTE
Brief Postoperative Note    Jean Paul Resendez  YOB: 1960  [id number]    Pre-operative Diagnosis: Memory difficulties    Post-operative Diagnosis: Same    Procedure: LP    Anesthesia: Local    Surgeons/Assistants: Dr. Jhony Schuster    Estimated Blood Loss: less than 50     Complications: None    Specimens: Was Obtained: 0.5ml clear CSF to lab. Findings: See radiology report for details. Technically successful LP with minimal fluid able to be obtained.     Electronically signed by Melva Bell MD on 7/18/2023 at 3:20 PM

## 2023-07-27 ENCOUNTER — TELEPHONE (OUTPATIENT)
Dept: NEUROLOGY | Age: 63
End: 2023-07-27

## 2023-07-27 DIAGNOSIS — G31.84 MILD COGNITIVE IMPAIRMENT: Primary | ICD-10-CM

## 2023-07-27 NOTE — TELEPHONE ENCOUNTER
[facility] called asking about the PET scan that was placed. It was listed for Gallium 68, she stated typically they only use that for pts that have neuro-endocrine tumors. I asked Dr Mindy Apodaca, he stated that we can change it to the PET Brain Metabolic. This was done.

## 2023-07-31 ENCOUNTER — HOSPITAL ENCOUNTER (OUTPATIENT)
Dept: NUCLEAR MEDICINE | Age: 63
Discharge: HOME | End: 2023-08-02
Payer: MEDICARE

## 2023-07-31 DIAGNOSIS — G31.84 MILD COGNITIVE IMPAIRMENT: ICD-10-CM

## 2023-07-31 LAB — METER GLUCOSE: 89 MG/DL (ref 65–105)

## 2023-07-31 PROCEDURE — 78608 BRAIN IMAGING (PET): CPT

## 2023-07-31 PROCEDURE — 82947 ASSAY GLUCOSE BLOOD QUANT: CPT

## 2023-07-31 PROCEDURE — 2580000003 HC RX 258: Performed by: PSYCHIATRY & NEUROLOGY

## 2023-07-31 PROCEDURE — 3430000000 HC RX DIAGNOSTIC RADIOPHARMACEUTICAL: Performed by: PSYCHIATRY & NEUROLOGY

## 2023-07-31 PROCEDURE — A9552 F18 FDG: HCPCS | Performed by: PSYCHIATRY & NEUROLOGY

## 2023-07-31 RX ORDER — FLUDEOXYGLUCOSE F 18 200 MCI/ML
10 INJECTION, SOLUTION INTRAVENOUS
Status: COMPLETED | OUTPATIENT
Start: 2023-07-31 | End: 2023-07-31

## 2023-07-31 RX ORDER — SODIUM CHLORIDE 0.9 % (FLUSH) 0.9 %
10 SYRINGE (ML) INJECTION PRN
Status: DISCONTINUED | OUTPATIENT
Start: 2023-07-31 | End: 2023-08-03 | Stop reason: HOSPADM

## 2023-07-31 RX ADMIN — SODIUM CHLORIDE, PRESERVATIVE FREE 10 ML: 5 INJECTION INTRAVENOUS at 09:27

## 2023-07-31 RX ADMIN — FLUDEOXYGLUCOSE F 18 8.59 MILLICURIE: 200 INJECTION, SOLUTION INTRAVENOUS at 08:46

## 2023-08-02 ENCOUNTER — OFFICE VISIT (OUTPATIENT)
Dept: NEUROLOGY | Age: 63
End: 2023-08-02
Payer: MEDICARE

## 2023-08-02 VITALS
HEIGHT: 63 IN | HEART RATE: 60 BPM | SYSTOLIC BLOOD PRESSURE: 93 MMHG | DIASTOLIC BLOOD PRESSURE: 68 MMHG | BODY MASS INDEX: 30.05 KG/M2 | WEIGHT: 169.6 LBS

## 2023-08-02 DIAGNOSIS — G31.84 MILD COGNITIVE IMPAIRMENT: Primary | ICD-10-CM

## 2023-08-02 PROCEDURE — 1036F TOBACCO NON-USER: CPT | Performed by: PSYCHIATRY & NEUROLOGY

## 2023-08-02 PROCEDURE — 3017F COLORECTAL CA SCREEN DOC REV: CPT | Performed by: PSYCHIATRY & NEUROLOGY

## 2023-08-02 PROCEDURE — 1090F PRES/ABSN URINE INCON ASSESS: CPT | Performed by: PSYCHIATRY & NEUROLOGY

## 2023-08-02 PROCEDURE — 1123F ACP DISCUSS/DSCN MKR DOCD: CPT | Performed by: PSYCHIATRY & NEUROLOGY

## 2023-08-02 PROCEDURE — G8427 DOCREV CUR MEDS BY ELIG CLIN: HCPCS | Performed by: PSYCHIATRY & NEUROLOGY

## 2023-08-02 PROCEDURE — 99214 OFFICE O/P EST MOD 30 MIN: CPT | Performed by: PSYCHIATRY & NEUROLOGY

## 2023-08-02 PROCEDURE — G8417 CALC BMI ABV UP PARAM F/U: HCPCS | Performed by: PSYCHIATRY & NEUROLOGY

## 2023-08-02 PROCEDURE — G8400 PT W/DXA NO RESULTS DOC: HCPCS | Performed by: PSYCHIATRY & NEUROLOGY

## 2023-08-02 RX ORDER — MEMANTINE HYDROCHLORIDE 5 MG/1
5 TABLET ORAL 2 TIMES DAILY
Qty: 60 TABLET | Refills: 3 | Status: SHIPPED | OUTPATIENT
Start: 2023-08-02

## 2023-08-02 NOTE — PROGRESS NOTES
[facility]  [address] [facility], [address] [place] [address], [address]  Ph: [phone] or [phone]  FAX: [phone]    Chief Complaint: Memory loss     Dear Mirtha Peres, DO     I had the pleasure of seeing your patient today in neurology consultation for her symptoms. As you would recall Claudia Cunningham is a 70 y.o. female. The patient presents to the office to establish neurological care. History was obtained from the patient and accompanied medical records. She is accompanied by her . The patient presents with a history of rapid decline in short term memory. Symptoms began approximately one year ago, but she reports that symptoms worsened more significantly in March 2023 after the passing of her mother. Patient is reported to repeat questions and statements. Admits to symptoms associated with depression, which has worsened falling the passing. She also admits to experiencing more frequent falls, which have been observed within the year due to muscle weakness. She also admits to daily episodic muscle jerks in her upper extremities, which happens more frequently in the mornings. Admits to experiencing tremors in her upper/lower extremities. Denies any prior history of seizures. Also denies hallucinations, behavioral changes, or syncope episodes. Her  manages the finances, which was taken over by him after he retired. Admits to driving short distances. Patient's  reports the patient has a lifelong history of clumsiness. The patient presents to the office on 08/02/23 as a follow-up visit. Since the last visit, she has completed a PET Brain. Particularly in the morning, she notes bilateral tremors in upper and lower extremities, upper extremities are worse. She denies difficulty remembering the names of her family members or any recent falls but notes increased awareness. The patient reports discontinuing Ativan 0.5 mg BID due to diarrhea.        Neurological

## 2023-09-27 ENCOUNTER — TELEPHONE (OUTPATIENT)
Dept: GENERAL RADIOLOGY | Age: 63
End: 2023-09-27

## 2023-09-27 NOTE — TELEPHONE ENCOUNTER
Called patient and she states that she is not going to obtain SBFT at this time. She has some other health and mental issues that need addressed first. Patient states that she is feeling better . Explained to patient that she can call the office when decides to schedule or having issues. Order is canceled.

## 2023-09-27 NOTE — TELEPHONE ENCOUNTER
An older for a small bowel follow through just fell back in our scheduling [facility]. Does this still need done? If not, do you mind deleting the order?   Thanks,  [place]  [facility]

## 2023-10-09 ENCOUNTER — TELEPHONE (OUTPATIENT)
Dept: SURGERY | Age: 63
End: 2023-10-09

## 2023-10-09 NOTE — TELEPHONE ENCOUNTER
Patient called stating she is in a lot of pain in her abdominal area. Stated she can not have a bowel movement and has been in horrible pain all weekend. Asking if she can be seen today. Can contact patient at # [phone].

## 2023-10-20 ENCOUNTER — INITIAL CONSULT (OUTPATIENT)
Dept: SURGERY | Age: 63
End: 2023-10-20
Payer: MEDICARE

## 2023-10-20 VITALS
SYSTOLIC BLOOD PRESSURE: 122 MMHG | BODY MASS INDEX: 29.37 KG/M2 | HEART RATE: 77 BPM | HEIGHT: 64 IN | WEIGHT: 172 LBS | DIASTOLIC BLOOD PRESSURE: 72 MMHG | TEMPERATURE: 97.8 F | RESPIRATION RATE: 16 BRPM

## 2023-10-20 DIAGNOSIS — K21.9 GASTROESOPHAGEAL REFLUX DISEASE WITHOUT ESOPHAGITIS: ICD-10-CM

## 2023-10-20 DIAGNOSIS — D50.9 IRON DEFICIENCY ANEMIA, UNSPECIFIED IRON DEFICIENCY ANEMIA TYPE: ICD-10-CM

## 2023-10-20 DIAGNOSIS — R10.13 EPIGASTRIC ABDOMINAL PAIN: Primary | ICD-10-CM

## 2023-10-20 PROCEDURE — G8427 DOCREV CUR MEDS BY ELIG CLIN: HCPCS | Performed by: SURGERY

## 2023-10-20 PROCEDURE — 1123F ACP DISCUSS/DSCN MKR DOCD: CPT | Performed by: SURGERY

## 2023-10-20 PROCEDURE — 1090F PRES/ABSN URINE INCON ASSESS: CPT | Performed by: SURGERY

## 2023-10-20 PROCEDURE — G8417 CALC BMI ABV UP PARAM F/U: HCPCS | Performed by: SURGERY

## 2023-10-20 PROCEDURE — G8484 FLU IMMUNIZE NO ADMIN: HCPCS | Performed by: SURGERY

## 2023-10-20 PROCEDURE — 99213 OFFICE O/P EST LOW 20 MIN: CPT | Performed by: SURGERY

## 2023-10-20 PROCEDURE — G8400 PT W/DXA NO RESULTS DOC: HCPCS | Performed by: SURGERY

## 2023-10-20 PROCEDURE — 1036F TOBACCO NON-USER: CPT | Performed by: SURGERY

## 2023-10-20 PROCEDURE — 3017F COLORECTAL CA SCREEN DOC REV: CPT | Performed by: SURGERY

## 2023-10-20 RX ORDER — PSEUDOEPHEDRINE HCL 30 MG
100 TABLET ORAL 2 TIMES DAILY
COMMUNITY
Start: 2023-10-11 | End: 2023-11-10

## 2023-10-20 RX ORDER — FLUOXETINE 20 MG/1
30 TABLET ORAL DAILY
COMMUNITY
Start: 2023-05-16 | End: 2023-10-20

## 2023-10-20 NOTE — PROGRESS NOTES
Cari Morel is a 70 y.o. female      CC:    Heartburn  Pepcid 20 mg daily prevacid bid  Crampy abd pain  Change in stools , with thin stools    HISTORY OF PRESENT ILLNESS:    Pt is 71 yo here for anemia, heartburn and crampy abd pain. Reason for evaluation: Constipation, Abd pain                      EGD  Nausea: no  Vomiting: no  Heartburn:yes, everyday  Dysphagia:no  Hematemesis:no  Epigastric pain:no  Anemia: no  Previous work up date:February10 2023 with Dr. Slade Gonzalez @ [facility] - Mild gastritis, negative H pylori   Current Treatment:Pepcid 20 mg , Prevacid 30 mg x2/day       Colonoscopy  Abd pain: yes, constant , cramping   Anemia: no  Bloating:yes, with even just the smallest meal   Diarrhea: no  Constipation: yes, one bad episode on 10/9 - CT scan normal suggested stool softeners and miralax which patient has since stopped taking due to making bowels too loose  Melena: no  Hematochezia:no  Rectal Bleeding:no  Rectal/Anal Pain:yes, with constipation   Pruritus: no  Family history colon Cancer: no  Previous colon cancer: no  Previous Colon Polyp: no  Change in bowels: yes, constipation   Decrease caliber of stool: yes, stools are ribbon like   Change in color of stool: no  Previous work up date: 12/1/2017- with Dr. Slade Gonzalez @ [facility]-normal         Patient was scheduled for SBFT and EGD with 48 hour bravo this summer but she cancelled because she was feeling better and was having other personal issues at the time. Called into office on 10/9/23 complaining of horrible abd pain. Recommended patient go to ER. Patient went to [facility] ER- CT scan normal -- constipation.  ER suggested     Review of Systems:    General:  Fever: Negative  Weight Change:Negative  Night Sweats: Negative    Eye:  Blurry Vision:Negative  Double Vision: Negative    Ent:  Headaches: Negative  Sore throat: Negative    Allergy/Immunology:  Hives: Negative  Latex allergy: Negative    Hematology/Lymphatic:  Bleeding Problems: Negative  Blood Clots:

## 2023-10-20 NOTE — PROGRESS NOTES
Reason for evaluation: Constipation, Abd pain         EGD  Nausea: no  Vomiting: no  Heartburn:yes, everyday  Dysphagia:no  Hematemesis:no  Epigastric pain:no  Anemia: no  Previous work up date:February10 2023 with Dr. Boby Corea @ [facility]3] - Mild gastritis, negative H pylori   Current Treatment:Pepcid 20 mg , Prevacid 30 mg x2/day          Colonoscopy  Abd pain: yes, constant , cramping   Anemia: no  Bloating:yes, with even just the smallest meal   Diarrhea: no  Constipation: yes, one bad episode on 10/9 - CT scan normal suggested stool softeners and miralax which patient has since stopped taking due to making bowels too loose  Melena: no  Hematochezia:no  Rectal Bleeding:no  Rectal/Anal Pain:yes, with constipation   Pruritus: no  Family history colon Cancer: no  Previous colon cancer: no  Previous Colon Polyp: no  Change in bowels: yes, constipation   Decrease caliber of stool: yes, stools are ribbon like   Change in color of stool: no  Previous work up date: 12/1/2017- with Dr. Boby Corea @ [facility]-normal       Patient was scheduled for SBFT and EGD with 48 hour bravo this summer but she cancelled because she was feeling better and was having other personal issues at the time. Called into office on 10/9/23 complaining of horrible abd pain. Recommended patient go to ER. Patient went to [facility] ER- CT scan normal -- constipation. ER suggested stool softeners and laxatives. Patient no longer taking due to making stools too loose.

## 2023-11-03 ENCOUNTER — TELEPHONE (OUTPATIENT)
Dept: SURGERY | Age: 63
End: 2023-11-03

## 2023-11-03 ENCOUNTER — OFFICE VISIT (OUTPATIENT)
Dept: SURGERY | Age: 63
End: 2023-11-03
Payer: MEDICARE

## 2023-11-03 VITALS
TEMPERATURE: 98.8 F | DIASTOLIC BLOOD PRESSURE: 74 MMHG | BODY MASS INDEX: 29.02 KG/M2 | RESPIRATION RATE: 16 BRPM | SYSTOLIC BLOOD PRESSURE: 110 MMHG | HEIGHT: 64 IN | HEART RATE: 80 BPM | WEIGHT: 170 LBS

## 2023-11-03 DIAGNOSIS — R19.4 CHANGE IN BOWEL HABIT: ICD-10-CM

## 2023-11-03 DIAGNOSIS — R10.9 ABDOMINAL PAIN, UNSPECIFIED ABDOMINAL LOCATION: Primary | ICD-10-CM

## 2023-11-03 PROCEDURE — 1090F PRES/ABSN URINE INCON ASSESS: CPT | Performed by: SURGERY

## 2023-11-03 PROCEDURE — 3017F COLORECTAL CA SCREEN DOC REV: CPT | Performed by: SURGERY

## 2023-11-03 PROCEDURE — G8427 DOCREV CUR MEDS BY ELIG CLIN: HCPCS | Performed by: SURGERY

## 2023-11-03 PROCEDURE — 1036F TOBACCO NON-USER: CPT | Performed by: SURGERY

## 2023-11-03 PROCEDURE — G8400 PT W/DXA NO RESULTS DOC: HCPCS | Performed by: SURGERY

## 2023-11-03 PROCEDURE — G8417 CALC BMI ABV UP PARAM F/U: HCPCS | Performed by: SURGERY

## 2023-11-03 PROCEDURE — G8484 FLU IMMUNIZE NO ADMIN: HCPCS | Performed by: SURGERY

## 2023-11-03 PROCEDURE — 1123F ACP DISCUSS/DSCN MKR DOCD: CPT | Performed by: SURGERY

## 2023-11-03 PROCEDURE — 99212 OFFICE O/P EST SF 10 MIN: CPT | Performed by: SURGERY

## 2023-11-03 NOTE — PROGRESS NOTES
Pt here after EGD and CS/  Still having some GERD and heartburn, but on prevacid bid and pepcid nighty. EGD fairly normal.  If persists may need 48 hour bravo. She does not want to do it now. Her main complaint is thin stools and bloating    We sill check SBFT , as we tried to do this summer , to rule out PSBO from adhesions. This could be causeing some of her bloating . Also will add fiber to her diet , in am and pm to help add bulk to stool. If not improved ,then to  let  us know.     Then might get 48 hour      Total time 15 min

## 2023-11-03 NOTE — PATIENT INSTRUCTIONS
Continue Prevacid 30 mg twice a day and Pepcid 20 mg twice a day. If you need refills on medications contact our office at [phone]. Dr. Thea Bence recommends to take a fiber supplement over the counter twice a day to help bulk up the stool. Contact [facility] radiology at [phone] to schedule small bowel follow through. Order has been send to radiology department.

## 2023-11-03 NOTE — PROGRESS NOTES
Patient here for a follow up post EGD and Colonoscopy on 10/27/2023 at [facility] with Dr. Nohemi Ann. Which showed: mild distal esophageal inflammation, several small esophageal varices, diverticulosis, internal hemorrhoids, and hyperplastic polyp x1. Patient currently taking Prevacid 30 mg BID and Pepcid 20 mg BID.

## 2023-11-03 NOTE — TELEPHONE ENCOUNTER
Talked to patient with results from recent EGD and Colonoscopy at [facility] with Dr. Thea Bence on 10-27-23. Updated history, health maintenance, and recall. Forwarded results to PCP.

## 2023-12-06 DIAGNOSIS — R19.4 CHANGE IN BOWEL HABIT: ICD-10-CM

## 2023-12-06 DIAGNOSIS — R10.9 ABDOMINAL PAIN, UNSPECIFIED ABDOMINAL LOCATION: ICD-10-CM

## 2023-12-29 RX ORDER — MEMANTINE HYDROCHLORIDE 5 MG/1
5 TABLET ORAL 2 TIMES DAILY
Qty: 60 TABLET | Refills: 5 | Status: SHIPPED | OUTPATIENT
Start: 2023-12-29

## 2023-12-29 NOTE — TELEPHONE ENCOUNTER
Pharmacy requesting refill of Namenda 5 mg.       Medication active on med list yes      Date of last Rx: 8/2/2023 with 3 refills          verified by DANILO MALONE      Date of last appointment 8/2/2023    Next Visit Date:  2/28/2024

## 2024-07-15 NOTE — TELEPHONE ENCOUNTER
Called and left message on listed phone number to verify pharmacy. Waiting on patient to return call.

## 2024-07-19 NOTE — TELEPHONE ENCOUNTER
Four attempts have been made to contact the patient; another message has been left for the patient to clarify the pharmacy.

## 2024-07-19 NOTE — TELEPHONE ENCOUNTER
Pharmacy requesting refill of Namenda 5mg.      Medication active on med list yes      Date of last Rx: 12/29/2023 with 5 refills          verified by ARELY BABB      Date of last appointment 8/2/2023    Next Visit Date:  Visit date not found

## 2024-07-20 RX ORDER — MEMANTINE HYDROCHLORIDE 5 MG/1
5 TABLET ORAL 2 TIMES DAILY
Qty: 60 TABLET | Refills: 5 | Status: SHIPPED | OUTPATIENT
Start: 2024-07-20

## 2024-09-16 RX ORDER — MEMANTINE HYDROCHLORIDE 5 MG/1
5 TABLET ORAL 2 TIMES DAILY
Qty: 60 TABLET | Refills: 0 | OUTPATIENT
Start: 2024-09-16

## 2024-09-18 RX ORDER — MEMANTINE HYDROCHLORIDE 5 MG/1
5 TABLET ORAL 2 TIMES DAILY
Qty: 60 TABLET | Refills: 5 | Status: SHIPPED | OUTPATIENT
Start: 2024-09-18